# Patient Record
Sex: MALE | Race: WHITE
[De-identification: names, ages, dates, MRNs, and addresses within clinical notes are randomized per-mention and may not be internally consistent; named-entity substitution may affect disease eponyms.]

---

## 2018-12-02 ENCOUNTER — HOSPITAL ENCOUNTER (EMERGENCY)
Dept: HOSPITAL 80 - FED | Age: 23
Discharge: HOME | End: 2018-12-02
Payer: COMMERCIAL

## 2018-12-02 VITALS — DIASTOLIC BLOOD PRESSURE: 81 MMHG | SYSTOLIC BLOOD PRESSURE: 111 MMHG

## 2018-12-02 DIAGNOSIS — R06.02: ICD-10-CM

## 2018-12-02 DIAGNOSIS — R07.9: Primary | ICD-10-CM

## 2018-12-02 DIAGNOSIS — M54.2: ICD-10-CM

## 2018-12-02 DIAGNOSIS — Z87.891: ICD-10-CM

## 2018-12-02 LAB — PLATELET # BLD: 253 10^3/UL (ref 150–400)

## 2018-12-02 NOTE — EDPHY
H & P


Time Seen by Provider: 12/02/18 19:54


HPI/ROS: 





HPI


Chest pains.





23-year-old male by private vehicle with his girlfriend.  This patient reports 

that over the last 3-4 days he has had left anterior upper chest pain described 

as a tightness and slight burning sensation with some radiation up into the 

left side of his lower neck.  He has also had some mild shortness of breath.  

He suffers from anxiety and was started on fluoxetine sometime ago.  He is 

otherwise healthy and does not take any other prescription medications.  He is 

a former smoker.





ROS:





Constitutional:  No fever, no chills.  No weakness.


Eyes:  No discharge.  No changes in vision.


ENT:  No sore throat.  No nasal congestion or rhinorrhea.


Respiratory:  No cough.  As above


Cardiac:  As above, no palpitations.


Gastrointestinal:  No abdominal pain, no vomiting, no diarrhea.


Genitourinary:  No hematuria.  No dysuria or increased frequency with urination.


Musculoskeletal:  No back pain.  No neck pain.  No myalgias or arthralgias.


Skin:  No rashes.


Neurological:  No headache.  No focal weakness or altered sensation.





Past medical history:  Anxiety.  As above.





Social history:  Former smoker.  Denies IV drugs and street drugs.  No 

methamphetamine or cocaine use.  Drinks alcohol on weekends.  Student 

[employer].  Here with his girlfriend.





Physical Exam:





General Appearance:  Alert, no distress, mildly anxious.  This patient is 

responding to questions appropriately and in full sentences.  This patient 

appears well-hydrated and well-nourished.


Eyes:  Pupils equal and round no pallor or injection.  No lid edema, erythema 

or injection.


Respiratory:  There are no retractions, lungs are clear to auscultation with 

good air movement bilaterally.


Cardiovascular:  Regular rate and rhythm.  No murmur.


Gastrointestinal:  Abdomen is soft and nontender, no masses, bowel sounds 

normal.  No focal tenderness at McBurney's point.  No Norris sign.


Neurological:  Motor sensory function is grossly intact.  Cranial nerves are 

normal.  Gait is normal.


Skin:  Warm and dry, no rashes.


Musculoskeletal:  Neck is supple and nontender.


Extremities are symmetrical.  All joints range without pain or impingement.


Psychiatric:  No agitation.  No depression.





Database:





EKG:





EKG time is 8:00 p.m.; EKG shows a narrow complex normal sinus rhythm with a 

ventricular rate of 100.  The MD, QRS, QT intervals are within normal limits.  

There are no ST-T wave changes indicative of ischemic or injury pattern.  No 

evidence of right heart strain.  Interpreted by me.





Imaging:





Chest x-ray PA and lateral; the cardiac mediastinal silhouette is unremarkable.

  No evidence of infiltrate or pneumothorax.  No acute cardiopulmonary disease 

process noted.  Interpreted by me.





Procedures:





Emergency department course:





Triage vital signs reviewed.  He is mildly hypertensive.  Vital signs are 

otherwise normal.  IV was placed.  Does not have any significant risk factors.  

He is requesting a workup.  EKG obtained and reviewed by myself.





8:55 p.m., the patient was re-evaluated, resting comfortably at this time.  

Results of his diagnostic workup discussed with him.  The differential 

diagnosis of his condition was reviewed.  He feels comfortable going home at 

this time he denies any chest pain or discomfort currently.  His vital signs 

have remained stable.  Follow-up and return to emergency department precautions 

reviewed with him.  All of his questions were answered.  He was discharged from 

the emergency department in good condition with his girlfriend.





Differential Diagnosis:





The differential diagnosis on this patient includes but is not limited to 

anxiety reaction, pleurisy, costochondritis, esophageal spasm.  Acute coronary 

syndrome, pneumothorax, aortic dissection, pneumonia, pulmonary embolism 

unlikely.  This represents a partial list of diagnoses considered.  These 

considerations are based on history, physical exam, past history, reassessment 

and diagnostic testing.


Smoking Status: Never smoked


Constitutional: 


 Initial Vital Signs











Temperature (C)  36.5 C   12/02/18 19:51


 


Heart Rate  77   12/02/18 19:51


 


Respiratory Rate  16   12/02/18 19:51


 


Blood Pressure  140/88 H  12/02/18 19:51


 


O2 Sat (%)  97   12/02/18 19:51








 











O2 Delivery Mode               Room Air














Allergies/Adverse Reactions: 


 





No Known Allergies Allergy (Verified 12/02/18 19:54)


 








Home Medications: 














 Medication  Instructions  Recorded


 


FLUoxetine [Prozac 10 MG (*)]  12/02/18














Medical Decision Making





- Diagnostics


Imaging Results: 


 Imaging Impressions





Chest X-Ray  12/02/18 20:03


Impression:  No acute pulmonary disease.














- Data Points


Laboratory Results: 


 Laboratory Results





 12/02/18 20:04 





 12/02/18 20:04 





 











  12/02/18 12/02/18 12/02/18





  20:46 20:06 20:04


 


WBC      





    


 


RBC      





    


 


Hgb      





    


 


POC Hgb  16.0 gm/dL gm/dL    





   (13.7-17.5)   


 


Hct      





    


 


POC Hct  47 % %    





   (40-51)   


 


MCV      





    


 


MCH      





    


 


MCHC      





    


 


RDW      





    


 


Plt Count      





    


 


MPV      





    


 


Neut % (Auto)      





    


 


Lymph % (Auto)      





    


 


Mono % (Auto)      





    


 


Eos % (Auto)      





    


 


Baso % (Auto)      





    


 


Nucleat RBC Rel Count      





    


 


Absolute Neuts (auto)      





    


 


Absolute Lymphs (auto)      





    


 


Absolute Monos (auto)      





    


 


Absolute Eos (auto)      





    


 


Absolute Basos (auto)      





    


 


Absolute Nucleated RBC      





    


 


Immature Gran %      





    


 


Immature Gran #      





    


 


D-Dimer      < 0.27 ug/mLFEU ug/mLFEU





     (0.00-0.50) 


 


POC Sodium  140 mEq/L mEq/L    





   (135-145)   


 


Sodium      





    


 


POC Potassium  3.3 mEq/L mEq/L    





   (3.3-5.0)   


 


Potassium      





    


 


POC Chloride  101 mEq/L mEq/L    





   ()   


 


Chloride      





    


 


Carbon Dioxide      





    


 


Anion Gap      





    


 


POC BUN  12 mg/dL mg/dL    





   (7-23)   


 


BUN      





    


 


Creatinine      





    


 


POC Creatinine  0.9 mg/dL mg/dL    





   (0.7-1.3)   


 


Estimated GFR      





    


 


Glucose      





    


 


POC Glucose  95 mg/dL mg/dL    





   ()   


 


Calcium      





    


 


POC Troponin I    0.00 ng/mL ng/mL  





    (0.00-0.08)  














  12/02/18 12/02/18





  20:04 20:04


 


WBC    8.70 10^3/uL 10^3/uL





    (3.80-9.50) 


 


RBC    5.95 10^6/uL 10^6/uL





    (4.40-6.38) 


 


Hgb    16.5 g/dL g/dL





    (13.7-17.5) 


 


POC Hgb    





   


 


Hct    48.2 % %





    (40.0-51.0) 


 


POC Hct    





   


 


MCV    81.0 fL L fL





    (81.5-99.8) 


 


MCH    27.7 pg L pg





    (27.9-34.1) 


 


MCHC    34.2 g/dL g/dL





    (32.4-36.7) 


 


RDW    12.2 % %





    (11.5-15.2) 


 


Plt Count    253 10^3/uL 10^3/uL





    (150-400) 


 


MPV    9.5 fL fL





    (8.7-11.7) 


 


Neut % (Auto)    55.2 % %





    (39.3-74.2) 


 


Lymph % (Auto)    31.1 % %





    (15.0-45.0) 


 


Mono % (Auto)    7.7 % %





    (4.5-13.0) 


 


Eos % (Auto)    5.3 % %





    (0.6-7.6) 


 


Baso % (Auto)    0.5 % %





    (0.3-1.7) 


 


Nucleat RBC Rel Count    0.0 % %





    (0.0-0.2) 


 


Absolute Neuts (auto)    4.80 10^3/uL 10^3/uL





    (1.70-6.50) 


 


Absolute Lymphs (auto)    2.71 10^3/uL 10^3/uL





    (1.00-3.00) 


 


Absolute Monos (auto)    0.67 10^3/uL 10^3/uL





    (0.30-0.80) 


 


Absolute Eos (auto)    0.46 10^3/uL H 10^3/uL





    (0.03-0.40) 


 


Absolute Basos (auto)    0.04 10^3/uL 10^3/uL





    (0.02-0.10) 


 


Absolute Nucleated RBC    0.00 10^3/uL 10^3/uL





    (0-0.01) 


 


Immature Gran %    0.2 % %





    (0.0-1.1) 


 


Immature Gran #    0.02 10^3/uL 10^3/uL





    (0.00-0.10) 


 


D-Dimer    





   


 


POC Sodium    





   


 


Sodium  138 mEq/L mEq/L  





   (135-145)  


 


POC Potassium    





   


 


Potassium  3.7 mEq/L mEq/L  





   (3.3-5.0)  


 


POC Chloride    





   


 


Chloride  102 mEq/L mEq/L  





   ()  


 


Carbon Dioxide  24 mEq/l mEq/l  





   (22-31)  


 


Anion Gap  35 mEq/L H mEq/L  





   (6-14)  


 


POC BUN    





   


 


BUN  14 mg/dL mg/dL  





   (7-23)  


 


Creatinine  0.9 mg/dL mg/dL  





   (0.7-1.3)  


 


POC Creatinine    





   


 


Estimated GFR  > 60   





   


 


Glucose  105 mg/dL H mg/dL  





   ()  


 


POC Glucose    





   


 


Calcium  10.1 mg/dL mg/dL  





   (8.5-10.4)  


 


POC Troponin I    





   











Point of Care Test Results: 


 Chemistry











  12/02/18 12/02/18





  20:46 20:06


 


POC Sodium  140 mEq/L mEq/L  





   (135-145)  


 


POC Potassium  3.3 mEq/L mEq/L  





   (3.3-5.0)  


 


POC Chloride  101 mEq/L mEq/L  





   ()  


 


POC BUN  12 mg/dL mg/dL  





   (7-23)  


 


POC Creatinine  0.9 mg/dL mg/dL  





   (0.7-1.3)  


 


POC Glucose  95 mg/dL mg/dL  





   ()  


 


POC Troponin I    0.00 ng/mL ng/mL





    (0.00-0.08) 








 ISTAT H&H











  12/02/18





  20:46


 


POC Hgb  16.0 gm/dL gm/dL





   (13.7-17.5) 


 


POC Hct  47 % %





   (40-51) 














Departure





- Departure


Disposition: Home, Routine, Self-Care


Clinical Impression: 


 Chest pain





Condition: Good


Instructions:  Noncardiac Chest Pain (ED)


Additional Instructions: 


Read and follow provided instructions.





Follow-up with your primary care physician at the [facility] in 1-2 days for re-evaluation.





Return to the emergency department for worsening pain, shortness of breath or 

other serious concerns.


Referrals: 


NONE *PRIMARY CARE P,. [Primary Care Provider] - As per Instructions


[facility] [Clinic] - As per Instructions

## 2018-12-02 NOTE — CPEKG
Test Reason : OPEN

Blood Pressure : ***/*** mmHG

Vent. Rate : 100 BPM     Atrial Rate : 100 BPM

   P-R Int : 104 ms          QRS Dur : 096 ms

    QT Int : 367 ms       P-R-T Axes : 059 040 015 degrees

   QTc Int : 474 ms

 

Sinus tachycardia

Borderline prolonged QT interval

 

Confirmed by McCollester, Laughlin ([id number]) on 12/2/2018 9:41:43 PM

 

Referred By:             Confirmed By:Laughlin McCollester